# Patient Record
Sex: FEMALE | URBAN - METROPOLITAN AREA
[De-identification: names, ages, dates, MRNs, and addresses within clinical notes are randomized per-mention and may not be internally consistent; named-entity substitution may affect disease eponyms.]

---

## 2023-01-03 ENCOUNTER — APPOINTMENT (RX ONLY)
Dept: URBAN - METROPOLITAN AREA CLINIC 126 | Facility: CLINIC | Age: 53
Setting detail: DERMATOLOGY
End: 2023-01-03

## 2023-01-03 DIAGNOSIS — R21 RASH AND OTHER NONSPECIFIC SKIN ERUPTION: ICD-10-CM | Status: INADEQUATELY CONTROLLED

## 2023-01-03 DIAGNOSIS — H02.6 XANTHELASMA OF EYELID: ICD-10-CM

## 2023-01-03 PROBLEM — H02.60 XANTHELASMA OF UNSPECIFIED EYE, UNSPECIFIED EYELID: Status: ACTIVE | Noted: 2023-01-03

## 2023-01-03 PROCEDURE — ? PRESCRIPTION

## 2023-01-03 PROCEDURE — ? COUNSELING

## 2023-01-03 PROCEDURE — 99204 OFFICE O/P NEW MOD 45 MIN: CPT

## 2023-01-03 PROCEDURE — ? ADDITIONAL NOTES

## 2023-01-03 PROCEDURE — ? MEDICATION COUNSELING

## 2023-01-03 RX ORDER — CLOBETASOL PROPIONATE 0.5 MG/G
CREAM TOPICAL BID
Qty: 60 | Refills: 3 | Status: ERX | COMMUNITY
Start: 2023-01-03

## 2023-01-03 RX ADMIN — CLOBETASOL PROPIONATE: 0.5 CREAM TOPICAL at 00:00

## 2023-01-03 ASSESSMENT — LOCATION ZONE DERM
LOCATION ZONE: FACE
LOCATION ZONE: ARM

## 2023-01-03 ASSESSMENT — LOCATION DETAILED DESCRIPTION DERM
LOCATION DETAILED: LEFT ELBOW
LOCATION DETAILED: LEFT MEDIAL EYEBROW
LOCATION DETAILED: RIGHT MEDIAL EYEBROW
LOCATION DETAILED: RIGHT ELBOW

## 2023-01-03 ASSESSMENT — LOCATION SIMPLE DESCRIPTION DERM
LOCATION SIMPLE: LEFT ELBOW
LOCATION SIMPLE: LEFT EYEBROW
LOCATION SIMPLE: RIGHT ELBOW
LOCATION SIMPLE: RIGHT EYEBROW

## 2023-01-03 NOTE — HPI: SKIN LESION (XANTHELASMA)
How Severe Is It?: mild
Is This A New Presentation, Or A Follow-Up?: Skin Lesions
Additional History: Has had Xanthelasma treated in the past with cautery

## 2023-01-03 NOTE — HPI: RASH
What Type Of Note Output Would You Prefer (Optional)?: Standard Output
Is The Patient Presenting As Previously Scheduled?: Yes
How Severe Is Your Rash?: mild
Is This A New Presentation, Or A Follow-Up?: Rash
Additional History: Patient reports family history of eczema

## 2023-01-03 NOTE — PROCEDURE: ADDITIONAL NOTES
Additional Notes: Patient consent was obtained to proceed with the visit and recommended plan of care after discussion of all risks and benefits, including the risks of COVID-19 exposure.
Detail Level: Simple
Additional Notes: Patient advised to consult with facility that carries lasers that treat condition.\\nPatient quoted $150 for cauterization
Render Risk Assessment In Note?: no